# Patient Record
Sex: MALE | Race: WHITE
[De-identification: names, ages, dates, MRNs, and addresses within clinical notes are randomized per-mention and may not be internally consistent; named-entity substitution may affect disease eponyms.]

---

## 2023-04-26 ENCOUNTER — HOSPITAL ENCOUNTER (OUTPATIENT)
Dept: HOSPITAL 47 - RADUSWWP | Age: 58
Discharge: HOME | End: 2023-04-26
Attending: FAMILY MEDICINE
Payer: COMMERCIAL

## 2023-04-26 DIAGNOSIS — N50.3: Primary | ICD-10-CM

## 2023-04-26 DIAGNOSIS — N50.89: ICD-10-CM

## 2023-04-26 PROCEDURE — 93975 VASCULAR STUDY: CPT

## 2023-04-26 PROCEDURE — 76870 US EXAM SCROTUM: CPT

## 2023-04-26 NOTE — US
EXAMINATION TYPE: US scrotum with doppler.  Grayscale and color Doppler Duplex imaging performed of t
luis calros scrotum.

 

DATE OF EXAM: 4/26/2023

 

COMPARISON: NONE

 

CLINICAL INDICATION: Male, 57 years old with history of N50.89OTHER SPECIFIED DISORDERS OF THE MALE G
ENITAL ORGANS; Lump

 

EXAM MEASUREMENTS:

 

TESTICLES:

Right Testicle:  4.4 x 2.0 x 3.1  cm anechoic area seen 0.3 x 0.3 x 0.3 cm. 

Left Testicle:  3.8 x 2.3 x 3.3  cm

 

EPIDIDYMIS HEAD:

Right Epididymis:  1.7 x 1.5 x 1.0 cm Anechoic area seen 1.5 x 1.1 x 1.4 cm. 

Left Epididymis:  2.3 x 1.9 x 3.4 cm anechoic area 2.3 x 1.7 x 3.4 cm.  Questionable internal echoes.


 

Doppler performed to assess for testicular vascularity; good bilateral color flow and waveforms are s
een.   There is no evidence of testicular torsion.

 

Presence of hydroceles:  no

Presence of varicoceles:  no

 

 

IMPRESSION:

1.  Left epididymal cyst which could have internal echoes and represent a spermatocele.

2.  Appropriate arterial and venous spectral waveforms bilaterally.

3.  No evidence for testicular solid mass. There is an intratesticular simple appearing cyst in the r
ight testicle.